# Patient Record
Sex: FEMALE | Race: WHITE | Employment: FULL TIME | URBAN - METROPOLITAN AREA
[De-identification: names, ages, dates, MRNs, and addresses within clinical notes are randomized per-mention and may not be internally consistent; named-entity substitution may affect disease eponyms.]

---

## 2017-04-20 ENCOUNTER — APPOINTMENT (OUTPATIENT)
Dept: CT IMAGING | Facility: CLINIC | Age: 47
End: 2017-04-20
Attending: NURSE PRACTITIONER
Payer: COMMERCIAL

## 2017-04-20 ENCOUNTER — HOSPITAL ENCOUNTER (EMERGENCY)
Facility: CLINIC | Age: 47
Discharge: HOME | End: 2017-04-21
Attending: NURSE PRACTITIONER | Admitting: NURSE PRACTITIONER
Payer: COMMERCIAL

## 2017-04-20 DIAGNOSIS — R51.9 ACUTE NONINTRACTABLE HEADACHE, UNSPECIFIED HEADACHE TYPE: ICD-10-CM

## 2017-04-20 LAB
ALBUMIN SERPL-MCNC: 4.4 G/DL (ref 3.4–5)
ALP SERPL-CCNC: 81 U/L (ref 40–150)
ALT SERPL W P-5'-P-CCNC: 40 U/L (ref 0–50)
ANION GAP SERPL CALCULATED.3IONS-SCNC: 11 MMOL/L (ref 3–14)
APTT PPP: 26 SEC (ref 22–37)
AST SERPL W P-5'-P-CCNC: 24 U/L (ref 0–45)
BILIRUB SERPL-MCNC: 0.3 MG/DL (ref 0.2–1.3)
BUN SERPL-MCNC: 20 MG/DL (ref 7–30)
CALCIUM SERPL-MCNC: 9.1 MG/DL (ref 8.5–10.1)
CHLORIDE SERPL-SCNC: 104 MMOL/L (ref 94–109)
CO2 SERPL-SCNC: 24 MMOL/L (ref 20–32)
CREAT SERPL-MCNC: 0.7 MG/DL (ref 0.52–1.04)
ERYTHROCYTE [DISTWIDTH] IN BLOOD BY AUTOMATED COUNT: 12.7 % (ref 10–15)
GFR SERPL CREATININE-BSD FRML MDRD: ABNORMAL ML/MIN/1.7M2
GLUCOSE SERPL-MCNC: 132 MG/DL (ref 70–99)
HCT VFR BLD AUTO: 39.7 % (ref 35–47)
HGB BLD-MCNC: 14 G/DL (ref 11.7–15.7)
INR PPP: 0.95 (ref 0.86–1.14)
MCH RBC QN AUTO: 29.7 PG (ref 26.5–33)
MCHC RBC AUTO-ENTMCNC: 35.3 G/DL (ref 31.5–36.5)
MCV RBC AUTO: 84 FL (ref 78–100)
PLATELET # BLD AUTO: 200 10E9/L (ref 150–450)
POTASSIUM SERPL-SCNC: 3 MMOL/L (ref 3.4–5.3)
PROT SERPL-MCNC: 7.9 G/DL (ref 6.8–8.8)
RBC # BLD AUTO: 4.71 10E12/L (ref 3.8–5.2)
SODIUM SERPL-SCNC: 139 MMOL/L (ref 133–144)
WBC # BLD AUTO: 7.1 10E9/L (ref 4–11)

## 2017-04-20 PROCEDURE — 25000128 H RX IP 250 OP 636: Performed by: NURSE PRACTITIONER

## 2017-04-20 PROCEDURE — 80053 COMPREHEN METABOLIC PANEL: CPT | Performed by: NURSE PRACTITIONER

## 2017-04-20 PROCEDURE — 70450 CT HEAD/BRAIN W/O DYE: CPT

## 2017-04-20 PROCEDURE — 96375 TX/PRO/DX INJ NEW DRUG ADDON: CPT

## 2017-04-20 PROCEDURE — 85730 THROMBOPLASTIN TIME PARTIAL: CPT | Performed by: NURSE PRACTITIONER

## 2017-04-20 PROCEDURE — 99285 EMERGENCY DEPT VISIT HI MDM: CPT | Mod: 25

## 2017-04-20 PROCEDURE — 96365 THER/PROPH/DIAG IV INF INIT: CPT

## 2017-04-20 PROCEDURE — 25000125 ZZHC RX 250: Performed by: NURSE PRACTITIONER

## 2017-04-20 PROCEDURE — 85027 COMPLETE CBC AUTOMATED: CPT | Performed by: NURSE PRACTITIONER

## 2017-04-20 PROCEDURE — 85610 PROTHROMBIN TIME: CPT | Performed by: NURSE PRACTITIONER

## 2017-04-20 RX ORDER — DIPHENHYDRAMINE HYDROCHLORIDE 50 MG/ML
50 INJECTION INTRAMUSCULAR; INTRAVENOUS ONCE
Status: COMPLETED | OUTPATIENT
Start: 2017-04-20 | End: 2017-04-20

## 2017-04-20 RX ORDER — ONDANSETRON 2 MG/ML
8 INJECTION INTRAMUSCULAR; INTRAVENOUS ONCE
Status: COMPLETED | OUTPATIENT
Start: 2017-04-20 | End: 2017-04-20

## 2017-04-20 RX ORDER — HYDROMORPHONE HYDROCHLORIDE 1 MG/ML
0.5 INJECTION, SOLUTION INTRAMUSCULAR; INTRAVENOUS; SUBCUTANEOUS ONCE
Status: DISCONTINUED | OUTPATIENT
Start: 2017-04-20 | End: 2017-04-21 | Stop reason: HOSPADM

## 2017-04-20 RX ORDER — KETOROLAC TROMETHAMINE 15 MG/ML
15 INJECTION, SOLUTION INTRAMUSCULAR; INTRAVENOUS EVERY 6 HOURS PRN
Status: DISCONTINUED | OUTPATIENT
Start: 2017-04-20 | End: 2017-04-21 | Stop reason: HOSPADM

## 2017-04-20 RX ORDER — HYDROMORPHONE HYDROCHLORIDE 1 MG/ML
0.5 INJECTION, SOLUTION INTRAMUSCULAR; INTRAVENOUS; SUBCUTANEOUS
Status: DISCONTINUED | OUTPATIENT
Start: 2017-04-20 | End: 2017-04-20

## 2017-04-20 RX ORDER — METOCLOPRAMIDE HYDROCHLORIDE 5 MG/ML
10 INJECTION INTRAMUSCULAR; INTRAVENOUS ONCE
Status: COMPLETED | OUTPATIENT
Start: 2017-04-20 | End: 2017-04-20

## 2017-04-20 RX ADMIN — KETOROLAC TROMETHAMINE 15 MG: 15 INJECTION, SOLUTION INTRAMUSCULAR; INTRAVENOUS at 22:10

## 2017-04-20 RX ADMIN — METOCLOPRAMIDE 10 MG: 5 INJECTION, SOLUTION INTRAMUSCULAR; INTRAVENOUS at 22:13

## 2017-04-20 RX ADMIN — SODIUM CHLORIDE 1000 ML: 9 INJECTION, SOLUTION INTRAVENOUS at 23:05

## 2017-04-20 RX ADMIN — SODIUM CHLORIDE 1000 ML: 9 INJECTION, SOLUTION INTRAVENOUS at 22:07

## 2017-04-20 RX ADMIN — Medication 2 G: at 23:05

## 2017-04-20 RX ADMIN — ONDANSETRON 8 MG: 2 SOLUTION INTRAMUSCULAR; INTRAVENOUS at 22:07

## 2017-04-20 RX ADMIN — DIPHENHYDRAMINE HYDROCHLORIDE 50 MG: 50 INJECTION, SOLUTION INTRAMUSCULAR; INTRAVENOUS at 22:12

## 2017-04-20 ASSESSMENT — ENCOUNTER SYMPTOMS
HEADACHES: 1
VOMITING: 0
NAUSEA: 1
NUMBNESS: 0
WEAKNESS: 0

## 2017-04-20 NOTE — ED AVS SNAPSHOT
Emergency Department    [address] [place] [address]    Phone:  [phone]    Fax:  [phone]                                       Glenny Mitchell   MRN: [id number]    Department:   Emergency Department   Date of Visit:  4/20/2017           After Visit Summary Signature Page     I have received my discharge instructions, and my questions have been answered. I have discussed any challenges I see with this plan with the nurse or doctor.    ..........................................................................................................................................  Patient/Patient Representative Signature      ..........................................................................................................................................  Patient Representative Print Name and Relationship to Patient    ..................................................               ................................................  Date                                            Time    ..........................................................................................................................................  Reviewed by Signature/Title    ...................................................              ..............................................  Date                                                            Time

## 2017-04-20 NOTE — ED AVS SNAPSHOT
Emergency Department    [address] [facility] [address]    Phone:  [phone]    Fax:  [phone]                                       Glenny Mitchell   MRN: [id number]    Department:   Emergency Department   Date of Visit:  4/20/2017           Patient Information     Date Of Birth          1970        Your diagnoses for this visit were:     Acute nonintractable headache, unspecified headache type        You were seen by Júnior Davis, MARY MCGOWAN.      Follow-up Information     Follow up with Your doctor In 3 days.    Why:  if continuned symptoms or sooner if worsening        Follow up with  Emergency Department.    Specialty:  EMERGENCY MEDICINE    Why:  If symptoms worsen    Contact information:    [address] [facility] [address] [phone]        Discharge Instructions       Take ibuprofen 600 mg every 6 hours for the next 2 days to prevent recurrence of headache. Drink plenty of fluids, Small amounts of caffeine may help large amount will likely worsen your headache.    Discharge Instructions  Headache    You were seen today for a headache. Headaches may be caused by many different things such as muscle tension, sinus inflammation, anxiety and stress, having too little sleep, too much alcohol, some medical conditions or injury. You may have a migraine, which is caused by changes in the blood vessels in your head.  At this time your doctor does not find that your headache is a sign of anything dangerous or life-threatening.  However, sometimes the signs of serious illness do not show up right away.  If you have new or worse symptoms, you may need to be seen again in the emergency department or by your primary doctor.      Return to the Emergency Department if:    You get a fever of 101 F or higher.    Your headache gets much worse.    You get a stiff neck with your headache.    You get a new headache that is different or worse than headaches you have had  before.    You are vomiting and can t keep food or water down.    You have blurry or double vision or other problems with your eyes.    You have a new weakness on one side of your body.    You have difficulty with balance which is new.    You or your family thinks you are confused.    You have a seizure or convulsion.    What can I do to help myself?    Pain medications - You may take a pain medication such as Tylenol  (acetaminophen), Advil , Nuprin  (ibuprofen) or Aleve  (naproxen).  If you have been given a narcotic such as Vicodin  (hydrocodone with acetaminophen), Percocet  (oxycodone with acetaminophen), codeine, or a muscle relaxant such as Flexeril  (cyclobenzaprine) or Soma  (carisoprodol), do not drive for four hours after you have taken it. If the narcotic contains Tylenol  (acetaminophen), do not take Tylenol  with it. All narcotics will cause constipation, so eat a high fiber diet.        Take a pain reliever as soon as you notice symptoms.  Starting medications as soon as you start to have symptoms may lessen the amount of pain you have.    Relaxing in a quiet, dark room may help.    Get enough sleep and eat meals regularly.    Schedule an appointment with your primary physician as instructed, or at least within 1 week.    You may need to watch for certain foods or other things which may trigger your headaches.  Keeping a journal of your headaches and possible triggers may help you and your primary doctor to identify things which you should avoid which may be causing your headaches.  If you were given a prescription for medicine here today, be sure to read all of the information (including the package insert) that comes with your prescription.  This will include important information about the medicine, its side effects, and any warnings that you need to know about.  The pharmacist who fills the prescription can provide more information and answer questions you may have about the medicine.  If you have  questions or concerns that the pharmacist cannot address, please call or return to the Emergency Department.   Opioid Medication Information    Pain medications are among the most commonly prescribed medicines, so we are including this information for all our patients. If you did not receive pain medication or get a prescription for pain medicine, you can ignore it.     You may have been given a prescription for an opioid (narcotic) pain medicine and/or have received a pain medicine while here in the Emergency Department. These medicines can make you drowsy or impaired. You must not drive, operate dangerous equipment, or engage in any other dangerous activities while taking these medications. If you drive while taking these medications, you could be arrested for DUI, or driving under the influence. Do not drink any alcohol while you are taking these medications.     Opioid pain medications can cause addiction. If you have a history of chemical dependency of any type, you are at a higher risk of becoming addicted to pain medications.  Only take these prescribed medications to treat your pain when all other options have been tried. Take it for as short a time and as few doses as possible. Store your pain pills in a secure place, as they are frequently stolen and provide a dangerous opportunity for children or visitors in your house to start abusing these powerful medications. We will not replace any lost or stolen medicine.  As soon as your pain is better, you should flush all your remaining medication.     Many prescription pain medications contain Tylenol  (acetaminophen), including Vicodin , Tylenol #3 , Norco , Lortab , and Percocet .  You should not take any extra pills of Tylenol  if you are using these prescription medications or you can get very sick.  Do not ever take more than 3000 mg of acetaminophen in any 24 hour period.    All opioids tend to cause constipation. Drink plenty of water and eat foods that  have a lot of fiber, such as fruits, vegetables, prune juice, apple juice and high fiber cereal.  Take a laxative if you don t move your bowels at least every other day. Miralax , Milk of Magnesia, Colace , or Senna  can be used to keep you regular.      Remember that you can always come back to the Emergency Department if you are not able to see your regular doctor in the amount of time listed above, if you get any new symptoms, or if there is anything that worries you.          24 Hour Appointment Hotline       To make an appointment at any [facility], call [phone] ([phone]). If you don't have a family doctor or clinic, we will help you find one. [facility] clinics are conveniently located to serve the needs of you and your family.             Review of your medicines      Our records show that you are taking the medicines listed below. If these are incorrect, please call your family doctor or clinic.        Dose / Directions Last dose taken    CYMBALTA PO        Refills:  0        TOPAMAX PO        Refills:  0        TRAMADOL HCL PO        Refills:  0                Procedures and tests performed during your visit     CBC (+ platelets, no diff)    Comprehensive metabolic panel    Head CT w/o contrast    INR    PTT    Saline Lock IV      Orders Needing Specimen Collection     None      Pending Results     No orders found for last 3 day(s).            Pending Culture Results     No orders found for last 3 day(s).            Test Results From Your Hospital Stay        4/20/2017  9:46 PM      Narrative     CT SCAN OF THE HEAD WITHOUT CONTRAST   4/20/2017 9:43 PM     HISTORY: sudden onset headache    TECHNIQUE:  Axial images of the head and coronal reformations without  IV contrast material. Radiation dose for this scan was reduced using  automated exposure control, adjustment of the mA and/or kV according  to patient size, or iterative reconstruction technique.    COMPARISON: None.    FINDINGS:  The  ventricles are normal in size, shape and configuration.   The brain parenchyma and subarachnoid spaces are normal. There is no  evidence of intracranial hemorrhage, mass, acute infarct or anomaly.     The visualized portions of the sinuses and mastoids appear normal.  There is no evidence of trauma.        Impression     IMPRESSION: Normal CT scan of the head.      ERWIN ROBERTO MD         4/20/2017 10:03 PM      Component Results     Component Value Ref Range & Units Status    WBC 7.1 4.0 - 11.0 10e9/L Final    RBC Count 4.71 3.8 - 5.2 10e12/L Final    Hemoglobin 14.0 11.7 - 15.7 g/dL Final    Hematocrit 39.7 35.0 - 47.0 % Final    MCV 84 78 - 100 fl Final    MCH 29.7 26.5 - 33.0 pg Final    MCHC 35.3 31.5 - 36.5 g/dL Final    RDW 12.7 10.0 - 15.0 % Final    Platelet Count 200 150 - 450 10e9/L Final         4/20/2017 10:21 PM      Component Results     Component Value Ref Range & Units Status    Sodium 139 133 - 144 mmol/L Final    Potassium 3.0 (L) 3.4 - 5.3 mmol/L Final    Chloride 104 94 - 109 mmol/L Final    Carbon Dioxide 24 20 - 32 mmol/L Final    Anion Gap 11 3 - 14 mmol/L Final    Glucose 132 (H) 70 - 99 mg/dL Final    Urea Nitrogen 20 7 - 30 mg/dL Final    Creatinine 0.70 0.52 - 1.04 mg/dL Final    GFR Estimate >90  Non  GFR Calc   >60 mL/min/1.7m2 Final    GFR Estimate If Black >90   GFR Calc   >60 mL/min/1.7m2 Final    Calcium 9.1 8.5 - 10.1 mg/dL Final    Bilirubin Total 0.3 0.2 - 1.3 mg/dL Final    Albumin 4.4 3.4 - 5.0 g/dL Final    Protein Total 7.9 6.8 - 8.8 g/dL Final    Alkaline Phosphatase 81 40 - 150 U/L Final    ALT 40 0 - 50 U/L Final    AST 24 0 - 45 U/L Final         4/20/2017 10:15 PM      Component Results     Component Value Ref Range & Units Status    INR 0.95 0.86 - 1.14 Final         4/20/2017 10:15 PM      Component Results     Component Value Ref Range & Units Status    PTT 26 22 - 37 sec Final                Clinical Quality Measure: Blood Pressure  Screening     Your blood pressure was checked while you were in the emergency department today. The last reading we obtained was  BP: (!) 151/102 . Please read the guidelines below about what these numbers mean and what you should do about them.  If your systolic blood pressure (the top number) is less than 120 and your diastolic blood pressure (the bottom number) is less than 80, then your blood pressure is normal. There is nothing more that you need to do about it.  If your systolic blood pressure (the top number) is 120-139 or your diastolic blood pressure (the bottom number) is 80-89, your blood pressure may be higher than it should be. You should have your blood pressure rechecked within a year by a primary care provider.  If your systolic blood pressure (the top number) is 140 or greater or your diastolic blood pressure (the bottom number) is 90 or greater, you may have high blood pressure. High blood pressure is treatable, but if left untreated over time it can put you at risk for heart attack, stroke, or kidney failure. You should have your blood pressure rechecked by a primary care provider within the next 4 weeks.  If your provider in the emergency department today gave you specific instructions to follow-up with your doctor or provider even sooner than that, you should follow that instruction and not wait for up to 4 weeks for your follow-up visit.        Thank you for choosing [facility]       Thank you for choosing [facility] for your care. Our goal is always to provide you with excellent care. Hearing back from our patients is one way we can continue to improve our services. Please take a few minutes to complete the written survey that you may receive in the mail after you visit with us. Thank you!        [employer] Information     [facility] lets you send messages to your doctor, view your test results, renew your prescriptions, schedule appointments and more. To sign up, go to [email or web address] . Click  "on \"Log in\" on the left side of the screen, which will take you to the Welcome page. Then click on \"Sign up Now\" on the right side of the page.     You will be asked to enter the access code listed below, as well as some personal information. Please follow the directions to create your username and password.     Your access code is: [email or web address]  Expires: 2017 12:10 AM     Your access code will  in 90 days. If you need help or a new code, please call your [facility] clinic or [phone].        Care EveryWhere ID     This is your Care EveryWhere ID. This could be used by other organizations to access your [facility] medical records  [id number]        After Visit Summary       This is your record. Keep this with you and show to your community pharmacist(s) and doctor(s) at your next visit.                  "

## 2017-04-21 VITALS
HEIGHT: 61 IN | HEART RATE: 64 BPM | TEMPERATURE: 97.1 F | SYSTOLIC BLOOD PRESSURE: 141 MMHG | OXYGEN SATURATION: 98 % | DIASTOLIC BLOOD PRESSURE: 92 MMHG | RESPIRATION RATE: 14 BRPM | WEIGHT: 164 LBS | BODY MASS INDEX: 30.96 KG/M2

## 2017-04-21 NOTE — ED PROVIDER NOTES
"  History     Chief Complaint:  Headache     HPI   Glenny Mitchell is a 46 year old female who presents for evaluation of a headache. Tonight, the patient experienced the sudden onset of a severe headache with associated visual blurring and nausea. Due to her severe headache, the patient called EMS to bring her into the ED for evaluation. Currently in the ED, the patient rates her pain at a severity of 10/10 and she notes that she has never had a similar headache. Her visual blurring is currently resolved. She has not had any numbness, weakness, or vomiting in association with her current headache. She has no personal history of migraine headache.     Allergies:  Aspirin - Nausea      Medications:    Duloxetine  Tramadol  Totipalmate     Past Medical History:    SVT  Chronic back pain  Kidney stone     Past Surgical History:    Cardiac ablation  Lumbar surgery    Family History:    History reviewed. No pertinent family history.     Social History:  Tobacco use:    Never smoker  Alcohol use:    Positive  Marital status:       Accompanied to ED by:  EMS and       Review of Systems   Eyes: Positive for visual disturbance (blurred vision, resolved).   Gastrointestinal: Positive for nausea. Negative for vomiting.   Neurological: Positive for headaches. Negative for weakness and numbness.   All other systems reviewed and are negative.    Physical Exam   First Vitals:  BP: (!) 163/101  Pulse: 78  Temp: 97.1  F (36.2  C)  Resp: 14  Height: 154.9 cm (5' 1\")  Weight: 74.4 kg (164 lb)  SpO2: 99 %      Patient Vitals for the past 24 hrs:   BP Temp Pulse Resp SpO2 Height Weight   04/20/17 2311 (!) 151/102 - 66 - 100 % - -   04/20/17 2115 (!) 163/101 97.1  F (36.2  C) 78 14 99 % 1.549 m (5' 1\") 74.4 kg (164 lb)       Physical Exam  General:  Alert, Severe discomfort, well kept  HEENT:  Normal Voice, PERRL, EOM normal, oropharynx is normal, Uvula is midline, Conjunctivae and sclerae are normal, No " lymphadenopathy   Neck: Normal range of motion, No meningismus. There is no midline cervical spine pain/tenderness. No mass is detected  CV:  Regular rate and underlying rhythm, Normal Peripheral pulses. No murmurs, rubs or clicks  Resp: Lungs are clear, No tachypnea, Non-labored breathing, No wheezing, crackles, or rales   GI:  Abdomen is soft, there is no rigidity, No distension, No tympani, No rebound tenderness, Non-surgical without peritoneal features    MS:  No major joint effusions, No asymmetric leg swelling. No calf tenderness  Skin:  No rash or acute skin lesions noted, Warm, Dry  Neuro: Alert and oriented to person/place and time.  Cranial nerves II through XII grossly intact, Normal strength and sensation, follows commands, responds appropriately.  Psych: Awake. Alert.  Normal affect.  Appropriate interactions. Good eye contact    Emergency Department Course     Imaging:  Radiographic findings were communicated with the patient and family who voiced understanding of the findings.    Head CT w/o Contrast:  IMPRESSION: Normal CT scan of the head.  Per radiology.     Laboratory:  CBC: WNL (WBC 7.1, HGB 14.0, )  CMP: Potassium 3.0 low, Glucose 132 high, o/w WNL (Creatinine 0.70)  INR: 0.95  PTT: 26       Interventions:  2207 NS 1,000 mL IV  2207 Zofran 8 mg IV   2210 Toradol 15 mg IV   2212 Benadryl 50 mg IV   2213 Reglan 10 mg IV   2305 Magnesium sulfate 2 g IV   2305 NS 1,000 mL IV     Emergency Department Course:  Patient was brought to the ED via EMS.     Nursing notes and vitals reviewed.  2134: I performed an exam of the patient as documented above.    2240: I updated and reassessed the patient.     2343: I updated and reassessed the patient.     Findings and plan explained to the Patient and spouse. Patient discharged home with instructions regarding supportive care, medications, and reasons to return. The importance of close follow-up was reviewed.     Impression & Plan      Medical Decision  Making:  The patient presented with a persistent headache. She does not have a history of migraines.  Evaluation in the emergency department has been negative. The patient has not had any fever, weakness, numbness, paresthesia, neck stiffness or confusion. Meningitis, subarachnoid hemorrhage, CNS tumor, and stroke are considered as part of the differential, and considered unlikely. CT scan obtained and is negative. The pain has improved with medication interventions.  The patient should follow-up with her primary physician within 3 days. Advised to take Ibuprofen/tylenol on a scheduled basis for the next 2 days. If the headache continues or the frequency increases, consultation with neurology will be indicated.  Return if increasing pain, fever, vomiting or weakness.  Take prescribed medications as directed.      Diagnosis:    ICD-10-CM   1. Acute nonintractable headache, unspecified headache type R51       Disposition:  Discharged to home.     Olvin RODRIGUEZ, am serving as a scribe at 9:34 PM on 4/20/2017 to document services personally performed by Júnior Davis NP, based on my observations and the provider's statements to me.     EMERGENCY DEPARTMENT       Júnior Davis APRN CNP  04/21/17 0002

## 2017-04-21 NOTE — DISCHARGE INSTRUCTIONS
Take ibuprofen 600 mg every 6 hours for the next 2 days to prevent recurrence of headache. Drink plenty of fluids, Small amounts of caffeine may help large amount will likely worsen your headache.    Discharge Instructions  Headache    You were seen today for a headache. Headaches may be caused by many different things such as muscle tension, sinus inflammation, anxiety and stress, having too little sleep, too much alcohol, some medical conditions or injury. You may have a migraine, which is caused by changes in the blood vessels in your head.  At this time your doctor does not find that your headache is a sign of anything dangerous or life-threatening.  However, sometimes the signs of serious illness do not show up right away.  If you have new or worse symptoms, you may need to be seen again in the emergency department or by your primary doctor.      Return to the Emergency Department if:    You get a fever of 101 F or higher.    Your headache gets much worse.    You get a stiff neck with your headache.    You get a new headache that is different or worse than headaches you have had before.    You are vomiting and can t keep food or water down.    You have blurry or double vision or other problems with your eyes.    You have a new weakness on one side of your body.    You have difficulty with balance which is new.    You or your family thinks you are confused.    You have a seizure or convulsion.    What can I do to help myself?    Pain medications - You may take a pain medication such as Tylenol  (acetaminophen), Advil , Nuprin  (ibuprofen) or Aleve  (naproxen).  If you have been given a narcotic such as Vicodin  (hydrocodone with acetaminophen), Percocet  (oxycodone with acetaminophen), codeine, or a muscle relaxant such as Flexeril  (cyclobenzaprine) or Soma  (carisoprodol), do not drive for four hours after you have taken it. If the narcotic contains Tylenol  (acetaminophen), do not take Tylenol  with it. All  narcotics will cause constipation, so eat a high fiber diet.        Take a pain reliever as soon as you notice symptoms.  Starting medications as soon as you start to have symptoms may lessen the amount of pain you have.    Relaxing in a quiet, dark room may help.    Get enough sleep and eat meals regularly.    Schedule an appointment with your primary physician as instructed, or at least within 1 week.    You may need to watch for certain foods or other things which may trigger your headaches.  Keeping a journal of your headaches and possible triggers may help you and your primary doctor to identify things which you should avoid which may be causing your headaches.  If you were given a prescription for medicine here today, be sure to read all of the information (including the package insert) that comes with your prescription.  This will include important information about the medicine, its side effects, and any warnings that you need to know about.  The pharmacist who fills the prescription can provide more information and answer questions you may have about the medicine.  If you have questions or concerns that the pharmacist cannot address, please call or return to the Emergency Department.   Opioid Medication Information    Pain medications are among the most commonly prescribed medicines, so we are including this information for all our patients. If you did not receive pain medication or get a prescription for pain medicine, you can ignore it.     You may have been given a prescription for an opioid (narcotic) pain medicine and/or have received a pain medicine while here in the Emergency Department. These medicines can make you drowsy or impaired. You must not drive, operate dangerous equipment, or engage in any other dangerous activities while taking these medications. If you drive while taking these medications, you could be arrested for DUI, or driving under the influence. Do not drink any alcohol while you  are taking these medications.     Opioid pain medications can cause addiction. If you have a history of chemical dependency of any type, you are at a higher risk of becoming addicted to pain medications.  Only take these prescribed medications to treat your pain when all other options have been tried. Take it for as short a time and as few doses as possible. Store your pain pills in a secure place, as they are frequently stolen and provide a dangerous opportunity for children or visitors in your house to start abusing these powerful medications. We will not replace any lost or stolen medicine.  As soon as your pain is better, you should flush all your remaining medication.     Many prescription pain medications contain Tylenol  (acetaminophen), including Vicodin , Tylenol #3 , Norco , Lortab , and Percocet .  You should not take any extra pills of Tylenol  if you are using these prescription medications or you can get very sick.  Do not ever take more than 3000 mg of acetaminophen in any 24 hour period.    All opioids tend to cause constipation. Drink plenty of water and eat foods that have a lot of fiber, such as fruits, vegetables, prune juice, apple juice and high fiber cereal.  Take a laxative if you don t move your bowels at least every other day. Miralax , Milk of Magnesia, Colace , or Senna  can be used to keep you regular.      Remember that you can always come back to the Emergency Department if you are not able to see your regular doctor in the amount of time listed above, if you get any new symptoms, or if there is anything that worries you.

## 2019-09-10 ENCOUNTER — RECORDS - HEALTHEAST (OUTPATIENT)
Dept: ADMINISTRATIVE | Facility: OTHER | Age: 49
End: 2019-09-10

## 2022-03-15 ENCOUNTER — HOSPITAL ENCOUNTER (OUTPATIENT)
Facility: CLINIC | Age: 52
Setting detail: OBSERVATION
Discharge: HOME | End: 2022-03-16
Attending: EMERGENCY MEDICINE | Admitting: HOSPITALIST
Payer: COMMERCIAL

## 2022-03-15 DIAGNOSIS — R51.9 ACUTE NONINTRACTABLE HEADACHE, UNSPECIFIED HEADACHE TYPE: ICD-10-CM

## 2022-03-15 DIAGNOSIS — R41.89 CHANGE IN LEVEL OF CONSCIOUSNESS: ICD-10-CM

## 2022-03-15 DIAGNOSIS — H93.13 TINNITUS, BILATERAL: ICD-10-CM

## 2022-03-15 PROCEDURE — 99285 EMERGENCY DEPT VISIT HI MDM: CPT

## 2022-03-16 ENCOUNTER — APPOINTMENT (OUTPATIENT)
Dept: CT IMAGING | Facility: CLINIC | Age: 52
End: 2022-03-16
Attending: EMERGENCY MEDICINE
Payer: COMMERCIAL

## 2022-03-16 ENCOUNTER — APPOINTMENT (OUTPATIENT)
Dept: MRI IMAGING | Facility: CLINIC | Age: 52
End: 2022-03-16
Attending: EMERGENCY MEDICINE
Payer: COMMERCIAL

## 2022-03-16 VITALS
RESPIRATION RATE: 17 BRPM | SYSTOLIC BLOOD PRESSURE: 96 MMHG | DIASTOLIC BLOOD PRESSURE: 58 MMHG | OXYGEN SATURATION: 98 % | HEART RATE: 58 BPM | HEIGHT: 61 IN | TEMPERATURE: 98.5 F | BODY MASS INDEX: 30.58 KG/M2 | WEIGHT: 162 LBS

## 2022-03-16 PROBLEM — R41.89: Status: ACTIVE | Noted: 2022-03-16

## 2022-03-16 LAB
ALBUMIN SERPL-MCNC: 3.7 G/DL (ref 3.5–5)
ALP SERPL-CCNC: 66 U/L (ref 45–120)
ALT SERPL W P-5'-P-CCNC: 13 U/L (ref 0–45)
AMPHETAMINES UR QL SCN: NORMAL
ANION GAP SERPL CALCULATED.3IONS-SCNC: 11 MMOL/L (ref 5–18)
AST SERPL W P-5'-P-CCNC: 17 U/L (ref 0–40)
ATRIAL RATE - MUSE: 62 BPM
BARBITURATES UR QL: NORMAL
BENZODIAZ UR QL: NORMAL
BILIRUB SERPL-MCNC: 0.3 MG/DL (ref 0–1)
BUN SERPL-MCNC: 22 MG/DL (ref 8–22)
CALCIUM SERPL-MCNC: 9.2 MG/DL (ref 8.5–10.5)
CANNABINOIDS UR QL SCN: NORMAL
CHLORIDE BLD-SCNC: 104 MMOL/L (ref 98–107)
CO2 SERPL-SCNC: 22 MMOL/L (ref 22–31)
COCAINE UR QL: NORMAL
COHGB MFR BLD: 1.3 % (ref 0–1.5)
CREAT SERPL-MCNC: 1.03 MG/DL (ref 0.6–1.1)
CREAT UR-MCNC: 40 MG/DL
DIASTOLIC BLOOD PRESSURE - MUSE: 55 MMHG
ERYTHROCYTE [DISTWIDTH] IN BLOOD BY AUTOMATED COUNT: 12.5 % (ref 10–15)
ETHANOL SERPL-MCNC: 38 MG/DL
GFR SERPL CREATININE-BSD FRML MDRD: 66 ML/MIN/1.73M2
GLUCOSE BLD-MCNC: 97 MG/DL (ref 70–125)
HCT VFR BLD AUTO: 37.2 % (ref 35–47)
HGB BLD-MCNC: 12.8 G/DL (ref 11.7–15.7)
INTERPRETATION ECG - MUSE: NORMAL
LACTATE SERPL-SCNC: 2.3 MMOL/L (ref 0.7–2)
MCH RBC QN AUTO: 29.4 PG (ref 26.5–33)
MCHC RBC AUTO-ENTMCNC: 34.4 G/DL (ref 31.5–36.5)
MCV RBC AUTO: 86 FL (ref 78–100)
METHADONE UR QL SCN: NORMAL
OPIATES UR QL SCN: NORMAL
OXYCODONE UR QL: NORMAL
P AXIS - MUSE: 6 DEGREES
PCP UR QL SCN: NORMAL
PLATELET # BLD AUTO: 220 10E3/UL (ref 150–450)
POTASSIUM BLD-SCNC: 3.6 MMOL/L (ref 3.5–5)
PR INTERVAL - MUSE: 134 MS
PROT SERPL-MCNC: 6.8 G/DL (ref 6–8)
QRS DURATION - MUSE: 84 MS
QT - MUSE: 436 MS
QTC - MUSE: 442 MS
R AXIS - MUSE: 44 DEGREES
RBC # BLD AUTO: 4.35 10E6/UL (ref 3.8–5.2)
SARS-COV-2 RNA RESP QL NAA+PROBE: NEGATIVE
SODIUM SERPL-SCNC: 137 MMOL/L (ref 136–145)
SYSTOLIC BLOOD PRESSURE - MUSE: 88 MMHG
T AXIS - MUSE: 58 DEGREES
VENTRICULAR RATE- MUSE: 62 BPM
WBC # BLD AUTO: 8.7 10E3/UL (ref 4–11)

## 2022-03-16 PROCEDURE — C9803 HOPD COVID-19 SPEC COLLECT: HCPCS

## 2022-03-16 PROCEDURE — 82077 ASSAY SPEC XCP UR&BREATH IA: CPT | Performed by: EMERGENCY MEDICINE

## 2022-03-16 PROCEDURE — 80307 DRUG TEST PRSMV CHEM ANLYZR: CPT | Performed by: EMERGENCY MEDICINE

## 2022-03-16 PROCEDURE — 80053 COMPREHEN METABOLIC PANEL: CPT | Performed by: EMERGENCY MEDICINE

## 2022-03-16 PROCEDURE — 87635 SARS-COV-2 COVID-19 AMP PRB: CPT | Performed by: EMERGENCY MEDICINE

## 2022-03-16 PROCEDURE — 70450 CT HEAD/BRAIN W/O DYE: CPT

## 2022-03-16 PROCEDURE — A9585 GADOBUTROL INJECTION: HCPCS | Performed by: EMERGENCY MEDICINE

## 2022-03-16 PROCEDURE — 83605 ASSAY OF LACTIC ACID: CPT | Performed by: EMERGENCY MEDICINE

## 2022-03-16 PROCEDURE — 96361 HYDRATE IV INFUSION ADD-ON: CPT

## 2022-03-16 PROCEDURE — 93005 ELECTROCARDIOGRAM TRACING: CPT | Performed by: EMERGENCY MEDICINE

## 2022-03-16 PROCEDURE — G0378 HOSPITAL OBSERVATION PER HR: HCPCS

## 2022-03-16 PROCEDURE — 255N000002 HC RX 255 OP 636: Performed by: EMERGENCY MEDICINE

## 2022-03-16 PROCEDURE — 70553 MRI BRAIN STEM W/O & W/DYE: CPT

## 2022-03-16 PROCEDURE — 96360 HYDRATION IV INFUSION INIT: CPT

## 2022-03-16 PROCEDURE — 85027 COMPLETE CBC AUTOMATED: CPT | Performed by: EMERGENCY MEDICINE

## 2022-03-16 PROCEDURE — 36415 COLL VENOUS BLD VENIPUNCTURE: CPT | Performed by: EMERGENCY MEDICINE

## 2022-03-16 PROCEDURE — 258N000003 HC RX IP 258 OP 636: Performed by: EMERGENCY MEDICINE

## 2022-03-16 PROCEDURE — 82375 ASSAY CARBOXYHB QUANT: CPT | Performed by: EMERGENCY MEDICINE

## 2022-03-16 RX ORDER — SODIUM CHLORIDE 9 MG/ML
INJECTION, SOLUTION INTRAVENOUS CONTINUOUS
Status: DISCONTINUED | OUTPATIENT
Start: 2022-03-16 | End: 2022-03-16 | Stop reason: HOSPADM

## 2022-03-16 RX ORDER — GADOBUTROL 604.72 MG/ML
7 INJECTION INTRAVENOUS ONCE
Status: COMPLETED | OUTPATIENT
Start: 2022-03-16 | End: 2022-03-16

## 2022-03-16 RX ADMIN — SODIUM CHLORIDE 1000 ML: 9 INJECTION, SOLUTION INTRAVENOUS at 05:20

## 2022-03-16 RX ADMIN — GADOBUTROL 7 ML: 604.72 INJECTION INTRAVENOUS at 03:42

## 2022-03-16 RX ADMIN — SODIUM CHLORIDE 1000 ML: 9 INJECTION, SOLUTION INTRAVENOUS at 00:30

## 2022-03-16 RX ADMIN — SODIUM CHLORIDE 1000 ML: 9 INJECTION, SOLUTION INTRAVENOUS at 02:49

## 2022-03-16 NOTE — ED NOTES
Pt's BP low in triage. Brought back to room and MD called to bedside to assess patient. Patient staring into space and nonverbal at this time.  ...Ana Zeng RN

## 2022-03-16 NOTE — PROGRESS NOTES
Visited with patient around 0450. She was sleeping. I spoke loudly into her left ear awakening her. Thereafter she communicated verbally with me in what I appreciated as normal. No obvious deficits were observed. Her BP was improved and on par with what was recorded in prior outpt visits. After reviewing all of her labs and imagine with her and her , I discussed the initial plan of admission with her. I told pt I expected to call neurology in the morning for recommendations regarding what I expect was a complicated migraine. She understood and she requested discharge. I discussed with overnight ED MD who evaluated pt and felt is was appropriate for discharge home direct from the ED foregoing admission. I concur with Dr. Fung's decision making and feel she would be appropriate for Discharge with close neuro follow up.    Familia Meehan MD, MPH  Hospitalist  [facility]  Office # [phone]

## 2022-03-16 NOTE — ED TRIAGE NOTES
Pt arrives via EMS for headache and ringing in the ears. Started around 2300. Pt [place] with ringing present, able to answer questions when written down. Denies numbness or tingling.  with EMS.

## 2022-03-16 NOTE — ED PROVIDER NOTES
EMERGENCY DEPARTMENT ENCOUNTER      NAME: Glenny Mitchell  AGE: 51 year old female  YOB: 1970  MRN: [id number]  EVALUATION DATE & TIME: 3/15/2022 11:59 PM    PCP: Carolee Vargas    ED PROVIDER: Jose Benton M.D.      Chief Complaint   Patient presents with     Headache         FINAL IMPRESSION:  Acute alteration of level of consciousness  Hypotension    ED COURSE & MEDICAL DECISION MAKING:    Pertinent Labs & Imaging studies reviewed. (See chart for details)  51 year old female presents to the Emergency Department for evaluation of altered consciousness.  Per report of 's wife called out to him indicating something was wrong.  He was downstairs and she had been upstairs.  On going upstairs he found her sitting somewhat blankly.  He also noted she seemed to be unable to hear anything.  She indicated she does not want headache and was having ringing in her ears.  Patient did recently lose her job and has been under stress.  No obvious ingestions.   without symptoms.  Patient noted to be quite hypotensive in triage however she is able to stand easily and get out of the wheelchair and lay down on the bed.  She was not verbalizing and did not seem to be able to hear however she was able to follow gestures quite easily.  Repeat blood pressure was around 90 systolic.   reports she is on a number of medications but is not able to recall any of them.  Primary concern is potential toxicity of unknown substance.  Less likely represent issues related to intracranial pathology given her inability to follow simple gestures.  Labs sent for evaluation including drugs of abuse and alcohol level.  CT imaging the head to be obtained.  Intravenous fluids given for her relative hypotension.. Patient appears non toxic with stable vitals signs. Overall exam is benign.        11:57 AM I met with the patient for the initial interview and physical examination. Discussed plan for treatment and  workup in the ED.    1:26 AM.  Blood alcohol minimally elevated at 38.  Electrolytes normal.  CBC normal..  Take minimally elevated lites related to her alcohol use.  Car monoxide level normal at 1.3.  Blood pressure trending downward once again.  Additional liter bolus of saline ordered.  CT imaging the head pending.  Review of records indicate patient recently had refill of her tramadol and tizanidine.  However pupils are mid position unlikely represent tramadol toxicity.  Tizanidine toxicity a possibility.  1:44 AM.  CT of the head is unremarkable.  Results discussed with patient and her .  When I open the door it seemed she turned her head towards the door as if she could hear me coming in.  However patient still not responding to conversation.  Patient is only received about half a liter of saline in spite of her hypotension.  The need for increased fluid administration discussed with nurses  2:15 AM I rechecked the patients fluids.  Patient blood pressure near 100 systolic.  As he entered the room patient was watching the blood pressure monitor for her latest blood pressure.  Patient apparently cogent but still not verbalizing.  Plan will be for admission and additional fluids.   2:42 AM.  Patient discussed with Dr. Meehan..  He is agreeable with plan for admission.  Does request an MRI of the head out of caution.  This has been ordered.  Patient will be aborted within the emergency room.  2:46 AM.  Informed  of plan for admission and MRI.  He is agreeable.  Patient's blood pressure remained on the borderline side at about 88 systolic.  Will give an additional liter bolus of normal saline and remain on normal saline at 250 cc an hour  At the conclusion of the encounter I discussed the results of all of the tests and the disposition. The questions were answered and return precautions provided. The patient or family acknowledged understanding and was agreeable with the care plan.       PPE: Provider  wore gloves, N95 mask, eye protection, and surgical cap.     MEDICATIONS GIVEN IN THE EMERGENCY:  Medications   0.9% sodium chloride BOLUS (has no administration in time range)     Followed by   sodium chloride 0.9% infusion (has no administration in time range)       NEW PRESCRIPTIONS STARTED AT TODAY'S ER VISIT  New Prescriptions    No medications on file          =================================================================    HPI    Patient information was obtained from: Patient     Use of Intrepreter: N/A       Glenny Mitchell is a 51 year old female with no pertient medical history who presents to the ED for evaluation of headache.    Patient is hard of hearing due to ringing in her ear so the history was provided by patient's . Per the patient's , the patient began experiencing a headache and bilateral tinnitus that has caused her to be hard of hearing around 11:00 PM.  notes that the patient was in bed with a pillow over her ears when he responded to her call.  reports that he has no symptoms. Patient denies vomiting, diarrhea, or any other concerns at this time.       REVIEW OF SYSTEMS   Constitutional:  Denies fever, chills. Endorses headache and bilateral tinnitus.  Respiratory:  Denies productive cough or increased work of breathing  Cardiovascular:  Denies chest pain, palpitations  GI:  Denies abdominal pain, nausea, vomiting, or change in bowel or bladder habits   Musculoskeletal:  Denies any new muscle/joint swelling  Skin:  Denies rash   Neurologic:  Denies focal weakness  All systems negative except as marked.     PAST MEDICAL HISTORY:  Past Medical History:   Diagnosis Date     Chronic back pain      Kidney stones      SVT (supraventricular tachycardia) (H)        PAST SURGICAL HISTORY:  Past Surgical History:   Procedure Laterality Date     cardiac ablation       ORTHOPEDIC SURGERY      lumbar         CURRENT MEDICATIONS:      Current Facility-Administered  "Medications:      0.9% sodium chloride BOLUS, 1,000 mL, Intravenous, Once **FOLLOWED BY** sodium chloride 0.9% infusion, , Intravenous, Continuous, Jose Benton MD    Current Outpatient Medications:      DULoxetine HCl (CYMBALTA PO), , Disp: , Rfl:      Topiramate (TOPAMAX PO), , Disp: , Rfl:      TRAMADOL HCL PO, , Disp: , Rfl:     ALLERGIES:  Allergies   Allergen Reactions     Aspirin Nausea       FAMILY HISTORY:  No family history on file.    SOCIAL HISTORY:   Social History     Socioeconomic History     Marital status:      Spouse name: Not on file     Number of children: Not on file     Years of education: Not on file     Highest education level: Not on file   Occupational History     Not on file   Tobacco Use     Smoking status: Never Smoker     Smokeless tobacco: Not on file   Substance and Sexual Activity     Alcohol use: Yes     Comment: 3 days per week     Drug use: No     Sexual activity: Not on file   Other Topics Concern     Not on file   Social History Narrative     Not on file     Social Determinants of Health     Financial Resource Strain: Not on file   Food Insecurity: Not on file   Transportation Needs: Not on file   Physical Activity: Not on file   Stress: Not on file   Social Connections: Not on file   Intimate Partner Violence: Not on file   Housing Stability: Not on file       VITALS:  Patient Vitals for the past 24 hrs:   BP Temp Temp src Pulse Resp SpO2 Height Weight   03/16/22 0000 (!) 88/55 -- -- 63 -- 95 % -- --   03/15/22 2353 (!) 62/43 98.5  F (36.9  C) Oral 93 18 95 % 1.549 m (5' 1\") 73.5 kg (162 lb)        PHYSICAL EXAM    Constitutional:  Awake, alert, in mild distress  HENT:  Normocephalic, Atraumatic. Bilateral external ears normal. Oropharynx moist. Nose normal. Neck- Normal range of motion with no guarding, No midline cervical tenderness, Supple, No stridor.   Eyes:  PERRL, EOMI with no signs of entrapment, Conjunctiva normal, No discharge.   Respiratory:  Normal breath " sounds, No respiratory distress, No wheezing.    Cardiovascular:  Normal heart rate, Normal rhythm, No appreciable rubs or gallops.   GI:  Soft, No tenderness, No distension, No palpable masses  Musculoskeletal:  Intact distal pulses, No edema. Good range of motion in all major joints. No tenderness to palpation or major deformities noted.  Integument:  Warm, Dry, No erythema, No rash.   Neurologic:  Alert & oriented, Normal motor function, Normal sensory function, No focal deficits noted. Patient does not respond to verbal commands but is able to follow physical gestures.   Psychiatric:  Affect normal, Judgment normal, Mood normal.     LAB:  All pertinent labs reviewed and interpreted.  Results for orders placed or performed during the hospital encounter of 03/15/22   Head CT w/o contrast     Status: None    Narrative    EXAM: CT HEAD W/O CONTRAST  LOCATION: [facility]  DATE/TIME: 3/16/2022 1:05 AM    INDICATION: Headache, classic migraine  COMPARISON: 04/26/2017  TECHNIQUE: Routine CT Head without IV contrast. Multiplanar reformats. Dose reduction techniques were used.    FINDINGS:  INTRACRANIAL CONTENTS: No intracranial hemorrhage, extraaxial collection, or mass effect.  No CT evidence of acute infarct. Normal parenchymal attenuation. Normal ventricles and sulci.     VISUALIZED ORBITS/SINUSES/MASTOIDS: No intraorbital abnormality. Small polyp or retention cysts in the right maxillary sinus. No middle ear or mastoid effusion.    BONES/SOFT TISSUES: No acute abnormality.      Impression    IMPRESSION:  1.  Normal head CT.   Comprehensive metabolic panel     Status: Normal   Result Value Ref Range    Sodium 137 136 - 145 mmol/L    Potassium 3.6 3.5 - 5.0 mmol/L    Chloride 104 98 - 107 mmol/L    Carbon Dioxide (CO2) 22 22 - 31 mmol/L    Anion Gap 11 5 - 18 mmol/L    Urea Nitrogen 22 8 - 22 mg/dL    Creatinine 1.03 0.60 - 1.10 mg/dL    Calcium 9.2 8.5 - 10.5 mg/dL    Glucose 97 70 - 125  mg/dL    Alkaline Phosphatase 66 45 - 120 U/L    AST 17 0 - 40 U/L    ALT 13 0 - 45 U/L    Protein Total 6.8 6.0 - 8.0 g/dL    Albumin 3.7 3.5 - 5.0 g/dL    Bilirubin Total 0.3 0.0 - 1.0 mg/dL    GFR Estimate 66 >60 mL/min/1.73m2   Lactic acid whole blood     Status: Abnormal   Result Value Ref Range    Lactic Acid 2.3 (H) 0.7 - 2.0 mmol/L   Ethyl Alcohol Level     Status: Abnormal   Result Value Ref Range    Alcohol, Blood 38 (H) None detected mg/dL   CBC (+ platelets, no diff)     Status: Normal   Result Value Ref Range    WBC Count 8.7 4.0 - 11.0 10e3/uL    RBC Count 4.35 3.80 - 5.20 10e6/uL    Hemoglobin 12.8 11.7 - 15.7 g/dL    Hematocrit 37.2 35.0 - 47.0 %    MCV 86 78 - 100 fL    MCH 29.4 26.5 - 33.0 pg    MCHC 34.4 31.5 - 36.5 g/dL    RDW 12.5 10.0 - 15.0 %    Platelet Count 220 150 - 450 10e3/uL   Carbon monoxide     Status: Normal   Result Value Ref Range    Carbon Monoxide 1.3 0.0 - 1.5 %   ECG 12-LEAD WITH MUSE (L[facility])     Status: None   Result Value Ref Range    Systolic Blood Pressure 88 mmHg    Diastolic Blood Pressure 55 mmHg    Ventricular Rate 62 BPM    Atrial Rate 62 BPM    OR Interval 134 ms    QRS Duration 84 ms     ms    QTc 442 ms    P Axis 6 degrees    R AXIS 44 degrees    T Axis 58 degrees    Interpretation ECG       Sinus rhythm  Normal ECG  No previous ECGs available  Confirmed by SEE ED PROVIDER NOTE FOR, ECG INTERPRETATION ([id number]),  TANGELA ROSALES ([id number]) on 3/16/2022 1:26:37 AM     Urine Drugs of Abuse Screen Panel 1+ - Drug Screen plus Methadone     Status: None ()    Narrative    The following orders were created for panel order Urine Drugs of Abuse Screen Panel 1+ - Drug Screen plus Methadone.  Procedure                               Abnormality         Status                     ---------                               -----------         ------                     Drugs of Abuse 1+ Panel,...[[id number]]                                                   Please view results for  these tests on the individual orders.     ECG:   Normal sinus rhythm.  Rate of 62.  Normal QRS.  Normal ST segments.  Normal EKG.  No prior tracing.    Independent review and interpretation of EKG    RADIOLOGY:  Reviewed all pertinent imaging. Please see official radiology report.  Head CT w/o contrast    (Results Pending)             I, Hayden Vieira, am serving as a scribe to document services personally performed by Jose Benton MD, based on my observation and the provider's statements to me. I, Jose Benton MD attest that Hayden Vieira is acting in a scribe capacity, has observed my performance of the services and has documented them in accordance with my direction.    Jose Benton M.D.  Emergency Medicine  [facility] EMERGENCY ROOM      Jose Benton MD  03/16/22 0243       Jose Benton MD  03/16/22 0246       Jose Benton MD  03/16/22 0248

## 2022-03-16 NOTE — ED PROVIDER NOTES
EMERGENCY DEPARTMENT SIGN OUT NOTE        ED COURSE AND MEDICAL DECISION MAKING    5:26 AM Spoke with Dr. Meehan, hospitalist, about patient.         In brief, Glenny Mitchell is a 51 year old female who initially presented for evaluation of headache. Patient began experiencing a headache and bilateral tinnitus that has caused her to be hard of hearing around 2300.  notes that the patient was in bed with a pillow over her ears when he responded to her call. Denies vomiting, diarrhea, or any additional complaints at this time.     Patient initially was aphasic and not answering questions.  Now is talking.  Does not want to be admitted.  Did examine the patient myself.  Has a normal neurologic exam.  TMs are clear bilaterally.  Had a negative MRI.  Unclear the etiology of her symptoms.  Could be atypical migraine versus seizure or other neurologic phenomenon.  I do not see any tumor or stroke or bleed.  She wishes to go home.  Had some borderline blood pressures.  Given IV fluids.  Blood pressure improved.  No signs of sepsis or infection.  No signs of HSV encephalitis.  I do not think she needs lumbar puncture.  Patient discharged home and follow-up with neurology.     Vital Signs and Nursing Notes Reviewed.  General:  NAD  HEENT: Oropharynx clear.  No obvious signs of trauma.  PERRL.  EOMI TMs clear bilaterally  Neck: Supple.  No lymphadenopathy.  Cardiac: RRR.  No murmurs, rubs or gallops  Lungs: Clear bilaterally.  Normal respiratory rate.    Abdomen:  Soft, Nontender, no rebound or guarding.  Back: No CVA tenderness.  Skin:  No rash.  No diaphoresis  Extremities:  No cyanosis, clubbing, or edema.  Neurological:  CN II-XII intact, Strength intact, Sensation intact, No pronator drift, Normal gait.  Pulse:  Symmetric in bilateral upper and lower extremities.        FINAL IMPRESSION    1. Change in level of consciousness        ED MEDS  Medications   0.9% sodium chloride BOLUS (0 mLs Intravenous Stopped 3/16/22  0307)     Followed by   sodium chloride 0.9% infusion (has no administration in time range)   0.9% sodium chloride BOLUS (has no administration in time range)   0.9% sodium chloride BOLUS (0 mLs Intravenous Stopped 3/16/22 0520)   0.9% sodium chloride BOLUS (1,000 mLs Intravenous New Bag 3/16/22 0520)   gadobutrol (GADAVIST) injection 7 mL (7 mLs Intravenous Given 3/16/22 0342)       LAB  Labs Ordered and Resulted from Time of ED Arrival to Time of ED Departure   LACTIC ACID WHOLE BLOOD - Abnormal       Result Value    Lactic Acid 2.3 (*)    ETHYL ALCOHOL LEVEL - Abnormal    Alcohol, Blood 38 (*)    COMPREHENSIVE METABOLIC PANEL - Normal    Sodium 137      Potassium 3.6      Chloride 104      Carbon Dioxide (CO2) 22      Anion Gap 11      Urea Nitrogen 22      Creatinine 1.03      Calcium 9.2      Glucose 97      Alkaline Phosphatase 66      AST 17      ALT 13      Protein Total 6.8      Albumin 3.7      Bilirubin Total 0.3      GFR Estimate 66     CBC WITH PLATELETS - Normal    WBC Count 8.7      RBC Count 4.35      Hemoglobin 12.8      Hematocrit 37.2      MCV 86      MCH 29.4      MCHC 34.4      RDW 12.5      Platelet Count 220     CARBON MONOXIDE - Normal    Carbon Monoxide 1.3     DRUGS OF ABUSE 1+ PANEL, URINE ([facility] ONLY)    Amphetamines Urine Screen Negative      Benzodiazepines Urine Screen Negative      Opiates Urine Screen Negative      PCP Urine Screen Negative      Cannabinoids Urine Screen Negative      Barbiturates Urine Screen Negative      Cocaine Urine Screen Negative      Methadone Urine Screen Negative      Oxycodone Urine Screen Negative      Creatinine Urine mg/dL 40     COVID-19 VIRUS (CORONAVIRUS) BY PCR         RADIOLOGY    MR Brain w/o & w Contrast   Final Result   IMPRESSION:   1.  Normal head MRI.   2.  1.5 cm polyp or retention cyst in the right maxillary sinus.      Head CT w/o contrast   Final Result   IMPRESSION:   1.  Normal head CT.          DISCHARGE MEDS  New Prescriptions    No  medications on file           Diogo Fung M.D.  Emergency Medicine  [facility] EMERGENCY ROOM  [facility] [facility] [id number] [phone]       Diogo Fung MD  03/16/22 0532

## 2022-03-16 NOTE — PROGRESS NOTES
Received admission page request from EDMD.  Patient has already had a fairly thorough work-up for bilateral sudden onset hearing loss with tinnitus.  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  See EDMD's note for details of interactions with patient.  Overall this is a very interesting presentation.  I have recommended MRI to assess for autoimmune/infectious etiologies or less likely infarction.  EDMD in agreement.    If MRI yields positive findings would recommend discussion with neurology regarding disposition.